# Patient Record
Sex: FEMALE | Race: WHITE | NOT HISPANIC OR LATINO | Employment: FULL TIME | URBAN - METROPOLITAN AREA
[De-identification: names, ages, dates, MRNs, and addresses within clinical notes are randomized per-mention and may not be internally consistent; named-entity substitution may affect disease eponyms.]

---

## 2017-08-01 PROBLEM — F32.A DEPRESSION: Status: ACTIVE | Noted: 2017-08-01

## 2017-08-02 PROBLEM — F34.1 DYSTHYMIC DISORDER: Chronic | Status: ACTIVE | Noted: 2017-08-01

## 2017-08-02 PROBLEM — Q60.0 CONGENITAL ABSENCE OF ONE KIDNEY: Status: ACTIVE | Noted: 2017-08-02

## 2017-08-02 PROBLEM — F41.9 ANXIETY: Status: ACTIVE | Noted: 2017-07-27

## 2017-08-02 PROBLEM — Q51.0: Chronic | Status: ACTIVE | Noted: 2017-08-02

## 2017-08-02 PROBLEM — F90.9 ADHD (ATTENTION DEFICIT HYPERACTIVITY DISORDER): Status: ACTIVE | Noted: 2017-08-02

## 2017-08-04 PROBLEM — F34.1 DYSTHYMIC DISORDER: Chronic | Status: ACTIVE | Noted: 2017-08-04

## 2017-08-04 PROBLEM — F31.81 BIPOLAR II DISORDER: Chronic | Status: ACTIVE | Noted: 2017-08-01

## 2017-08-08 PROBLEM — F34.1 DYSTHYMIC DISORDER: Chronic | Status: ACTIVE | Noted: 2017-08-08

## 2017-08-08 PROBLEM — Z51.5 COMFORT MEASURES ONLY STATUS: Status: ACTIVE | Noted: 2017-08-08

## 2020-09-10 DIAGNOSIS — Q60.0 CONGENITAL ABSENCE OF ONE KIDNEY: Primary | ICD-10-CM

## 2020-09-22 ENCOUNTER — LAB VISIT (OUTPATIENT)
Dept: LAB | Facility: HOSPITAL | Age: 30
End: 2020-09-22
Attending: INTERNAL MEDICINE
Payer: MEDICAID

## 2020-09-22 DIAGNOSIS — Q60.0 CONGENITAL ABSENCE OF ONE KIDNEY: ICD-10-CM

## 2020-09-22 LAB
ALBUMIN SERPL BCP-MCNC: 3.5 G/DL (ref 3.5–5.2)
ANION GAP SERPL CALC-SCNC: 12 MMOL/L (ref 8–16)
BASOPHILS # BLD AUTO: 0.07 K/UL (ref 0–0.2)
BASOPHILS NFR BLD: 0.8 % (ref 0–1.9)
BUN SERPL-MCNC: 11 MG/DL (ref 6–20)
CALCIUM SERPL-MCNC: 9.3 MG/DL (ref 8.7–10.5)
CHLORIDE SERPL-SCNC: 101 MMOL/L (ref 95–110)
CO2 SERPL-SCNC: 28 MMOL/L (ref 23–29)
CREAT SERPL-MCNC: 0.7 MG/DL (ref 0.5–1.4)
DIFFERENTIAL METHOD: ABNORMAL
EOSINOPHIL # BLD AUTO: 0.2 K/UL (ref 0–0.5)
EOSINOPHIL NFR BLD: 2.4 % (ref 0–8)
ERYTHROCYTE [DISTWIDTH] IN BLOOD BY AUTOMATED COUNT: 12.9 % (ref 11.5–14.5)
EST. GFR  (AFRICAN AMERICAN): >60 ML/MIN/1.73 M^2
EST. GFR  (NON AFRICAN AMERICAN): >60 ML/MIN/1.73 M^2
GLUCOSE SERPL-MCNC: 131 MG/DL (ref 70–110)
HCT VFR BLD AUTO: 40 % (ref 37–48.5)
HGB BLD-MCNC: 12.3 G/DL (ref 12–16)
IMM GRANULOCYTES # BLD AUTO: 0.05 K/UL (ref 0–0.04)
IMM GRANULOCYTES NFR BLD AUTO: 0.5 % (ref 0–0.5)
LYMPHOCYTES # BLD AUTO: 3 K/UL (ref 1–4.8)
LYMPHOCYTES NFR BLD: 31.9 % (ref 18–48)
MCH RBC QN AUTO: 27.9 PG (ref 27–31)
MCHC RBC AUTO-ENTMCNC: 30.8 G/DL (ref 32–36)
MCV RBC AUTO: 91 FL (ref 82–98)
MONOCYTES # BLD AUTO: 0.6 K/UL (ref 0.3–1)
MONOCYTES NFR BLD: 6.4 % (ref 4–15)
NEUTROPHILS # BLD AUTO: 5.4 K/UL (ref 1.8–7.7)
NEUTROPHILS NFR BLD: 58 % (ref 38–73)
NRBC BLD-RTO: 0 /100 WBC
PHOSPHATE SERPL-MCNC: 3 MG/DL (ref 2.7–4.5)
PLATELET # BLD AUTO: 328 K/UL (ref 150–350)
PMV BLD AUTO: 10.9 FL (ref 9.2–12.9)
POTASSIUM SERPL-SCNC: 4 MMOL/L (ref 3.5–5.1)
PTH-INTACT SERPL-MCNC: 41.6 PG/ML (ref 9–77)
RBC # BLD AUTO: 4.41 M/UL (ref 4–5.4)
SODIUM SERPL-SCNC: 141 MMOL/L (ref 136–145)
WBC # BLD AUTO: 9.29 K/UL (ref 3.9–12.7)

## 2020-09-22 PROCEDURE — 80069 RENAL FUNCTION PANEL: CPT

## 2020-09-22 PROCEDURE — 83970 ASSAY OF PARATHORMONE: CPT

## 2020-09-22 PROCEDURE — 82610 CYSTATIN C: CPT

## 2020-09-22 PROCEDURE — 85025 COMPLETE CBC W/AUTO DIFF WBC: CPT

## 2020-09-24 LAB
CYSTATIN C SERPL-MCNC: 0.63 MG/L (ref 0.59–0.98)
GFR/BSA.PRED SERPLBLD CYS-BASED-ARV: 124 ML/MIN/BSA

## 2020-09-29 RX ORDER — LAMOTRIGINE 100 MG/1
TABLET ORAL
COMMUNITY
Start: 2020-07-23

## 2020-09-29 RX ORDER — LAMOTRIGINE 200 MG/1
TABLET ORAL
COMMUNITY
Start: 2020-09-12

## 2020-09-30 ENCOUNTER — TELEPHONE (OUTPATIENT)
Dept: NEPHROLOGY | Facility: CLINIC | Age: 30
End: 2020-09-30

## 2020-10-13 ENCOUNTER — TELEPHONE (OUTPATIENT)
Dept: NEPHROLOGY | Facility: CLINIC | Age: 30
End: 2020-10-13

## 2020-10-13 DIAGNOSIS — Q60.0 CONGENITAL ABSENCE OF ONE KIDNEY: Primary | ICD-10-CM

## 2020-10-13 NOTE — TELEPHONE ENCOUNTER
----- Message from Laquita Ross sent at 10/13/2020  1:07 PM CDT -----  Pt is calling to reschedule an appt with office. I was unable to pull up schedule. Please call back at [phone]  .

## 2020-11-02 ENCOUNTER — LAB VISIT (OUTPATIENT)
Dept: LAB | Facility: HOSPITAL | Age: 30
End: 2020-11-02
Attending: INTERNAL MEDICINE
Payer: MEDICAID

## 2020-11-02 DIAGNOSIS — Q60.0 CONGENITAL ABSENCE OF ONE KIDNEY: ICD-10-CM

## 2020-11-02 PROCEDURE — 85025 COMPLETE CBC W/AUTO DIFF WBC: CPT

## 2020-11-02 PROCEDURE — 83970 ASSAY OF PARATHORMONE: CPT

## 2020-11-02 PROCEDURE — 80069 RENAL FUNCTION PANEL: CPT

## 2020-11-02 PROCEDURE — 36415 COLL VENOUS BLD VENIPUNCTURE: CPT | Mod: PO

## 2020-11-03 ENCOUNTER — TELEPHONE (OUTPATIENT)
Dept: NEPHROLOGY | Facility: CLINIC | Age: 30
End: 2020-11-03

## 2020-11-03 DIAGNOSIS — N39.0 UTI (URINARY TRACT INFECTION), UNCOMPLICATED: Primary | ICD-10-CM

## 2020-11-03 LAB
ALBUMIN SERPL BCP-MCNC: 4.3 G/DL (ref 3.5–5.2)
ANION GAP SERPL CALC-SCNC: 10 MMOL/L (ref 8–16)
BASOPHILS # BLD AUTO: 0.05 K/UL (ref 0–0.2)
BASOPHILS NFR BLD: 0.7 % (ref 0–1.9)
BUN SERPL-MCNC: 13 MG/DL (ref 6–20)
CALCIUM SERPL-MCNC: 8.9 MG/DL (ref 8.7–10.5)
CHLORIDE SERPL-SCNC: 102 MMOL/L (ref 95–110)
CO2 SERPL-SCNC: 27 MMOL/L (ref 23–29)
CREAT SERPL-MCNC: 0.8 MG/DL (ref 0.5–1.4)
DIFFERENTIAL METHOD: ABNORMAL
EOSINOPHIL # BLD AUTO: 0.2 K/UL (ref 0–0.5)
EOSINOPHIL NFR BLD: 2.8 % (ref 0–8)
ERYTHROCYTE [DISTWIDTH] IN BLOOD BY AUTOMATED COUNT: 13.3 % (ref 11.5–14.5)
EST. GFR  (AFRICAN AMERICAN): >60 ML/MIN/1.73 M^2
EST. GFR  (NON AFRICAN AMERICAN): >60 ML/MIN/1.73 M^2
GLUCOSE SERPL-MCNC: 57 MG/DL (ref 70–110)
HCT VFR BLD AUTO: 41.4 % (ref 37–48.5)
HGB BLD-MCNC: 12.9 G/DL (ref 12–16)
IMM GRANULOCYTES # BLD AUTO: 0.01 K/UL (ref 0–0.04)
IMM GRANULOCYTES NFR BLD AUTO: 0.1 % (ref 0–0.5)
LYMPHOCYTES # BLD AUTO: 3.1 K/UL (ref 1–4.8)
LYMPHOCYTES NFR BLD: 46.6 % (ref 18–48)
MCH RBC QN AUTO: 28.4 PG (ref 27–31)
MCHC RBC AUTO-ENTMCNC: 31.2 G/DL (ref 32–36)
MCV RBC AUTO: 91 FL (ref 82–98)
MONOCYTES # BLD AUTO: 0.5 K/UL (ref 0.3–1)
MONOCYTES NFR BLD: 7 % (ref 4–15)
NEUTROPHILS # BLD AUTO: 2.9 K/UL (ref 1.8–7.7)
NEUTROPHILS NFR BLD: 42.8 % (ref 38–73)
NRBC BLD-RTO: 0 /100 WBC
PHOSPHATE SERPL-MCNC: 2.7 MG/DL (ref 2.7–4.5)
PLATELET # BLD AUTO: 166 K/UL (ref 150–350)
PMV BLD AUTO: 12.6 FL (ref 9.2–12.9)
POTASSIUM SERPL-SCNC: 3.9 MMOL/L (ref 3.5–5.1)
PTH-INTACT SERPL-MCNC: 45 PG/ML (ref 9–77)
RBC # BLD AUTO: 4.54 M/UL (ref 4–5.4)
SODIUM SERPL-SCNC: 139 MMOL/L (ref 136–145)
WBC # BLD AUTO: 6.69 K/UL (ref 3.9–12.7)

## 2020-11-04 ENCOUNTER — OFFICE VISIT (OUTPATIENT)
Dept: NEPHROLOGY | Facility: CLINIC | Age: 30
End: 2020-11-04
Payer: MEDICAID

## 2020-11-04 VITALS
DIASTOLIC BLOOD PRESSURE: 63 MMHG | HEIGHT: 63 IN | BODY MASS INDEX: 21.41 KG/M2 | WEIGHT: 120.81 LBS | SYSTOLIC BLOOD PRESSURE: 105 MMHG | HEART RATE: 62 BPM

## 2020-11-04 DIAGNOSIS — N39.0 RECURRENT UTI (URINARY TRACT INFECTION): ICD-10-CM

## 2020-11-04 DIAGNOSIS — Q60.0 CONGENITAL ABSENCE OF ONE KIDNEY: Primary | ICD-10-CM

## 2020-11-04 DIAGNOSIS — N39.0 UTI (URINARY TRACT INFECTION), UNCOMPLICATED: ICD-10-CM

## 2020-11-04 PROCEDURE — 99999 PR PBB SHADOW E&M-EST. PATIENT-LVL IV: CPT | Mod: PBBFAC,,, | Performed by: INTERNAL MEDICINE

## 2020-11-04 PROCEDURE — 99204 OFFICE O/P NEW MOD 45 MIN: CPT | Mod: S$PBB,,, | Performed by: INTERNAL MEDICINE

## 2020-11-04 PROCEDURE — 99204 PR OFFICE/OUTPT VISIT, NEW, LEVL IV, 45-59 MIN: ICD-10-PCS | Mod: S$PBB,,, | Performed by: INTERNAL MEDICINE

## 2020-11-04 PROCEDURE — 99214 OFFICE O/P EST MOD 30 MIN: CPT | Mod: PBBFAC | Performed by: INTERNAL MEDICINE

## 2020-11-04 PROCEDURE — 99999 PR PBB SHADOW E&M-EST. PATIENT-LVL IV: ICD-10-PCS | Mod: PBBFAC,,, | Performed by: INTERNAL MEDICINE

## 2020-11-04 RX ORDER — OLANZAPINE 2.5 MG/1
2.5 TABLET ORAL NIGHTLY
COMMUNITY

## 2020-11-04 RX ORDER — SULFAMETHOXAZOLE AND TRIMETHOPRIM 400; 80 MG/1; MG/1
1 TABLET ORAL 2 TIMES DAILY
Qty: 60 TABLET | Refills: 0 | Status: SHIPPED | OUTPATIENT
Start: 2020-11-04

## 2020-11-04 RX ORDER — SERTRALINE HYDROCHLORIDE 100 MG/1
100 TABLET, FILM COATED ORAL DAILY
COMMUNITY

## 2020-11-04 NOTE — PATIENT INSTRUCTIONS
In order to prevent recurrent infections, we would recommend, frequent scheduled urinations which would help eliminate urine effectively without having any residual urine in the urinary bladder and help reduce the incidence of bladder infections.  Take cranberry juice at least half a glass 3 times a day to prevent bladder infections.    Drink plenty of fluids to make urine look like water

## 2020-11-04 NOTE — PROGRESS NOTES
Subjective:       Patient ID: Cristina Saini is a 30 y.o. White female who presents for new evaluation of solitary kidney and recurrent UTI    HPI     Patient is a 30-year-old female who has history of recurrent UTI.  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  Patient was told as a child that she has only 1 kidney.  Patient has had multiple urine testing done showing multiple episodes of pyuria and hematuria with symptoms of UTI.  She gets at least 1-2 3 episodes of UTI.  Patient does recall association of UTI with sexual intercourse.    Review of Systems   Constitutional: Negative for activity change, appetite change, chills, diaphoresis, fatigue, fever and unexpected weight change.   HENT: Negative for congestion, dental problem, drooling, postnasal drip, rhinorrhea and voice change.    Eyes: Negative for discharge.   Respiratory: Negative for apnea, cough, choking, chest tightness, shortness of breath, wheezing and stridor.    Cardiovascular: Negative for chest pain, palpitations and leg swelling.   Gastrointestinal: Negative for abdominal distention, blood in stool, constipation, diarrhea, nausea, rectal pain and vomiting.   Endocrine: Negative for cold intolerance, heat intolerance, polydipsia and polyuria.   Genitourinary: Negative for decreased urine volume, difficulty urinating, dysuria, enuresis, flank pain, frequency, hematuria and urgency.   Musculoskeletal: Negative for arthralgias, back pain, gait problem and joint swelling.   Skin: Negative for rash.   Allergic/Immunologic: Negative for food allergies and immunocompromised state.   Neurological: Negative for dizziness, tremors, syncope, numbness and headaches.   Hematological: Does not bruise/bleed easily.   Psychiatric/Behavioral: Negative for agitation, behavioral problems and self-injury. The patient is not nervous/anxious and is not hyperactive.    All other systems reviewed and are  "negative.      Objective:   /63   Pulse 62   Ht 5' 3" (1.6 m)   Wt 54.8 kg (120 lb 13 oz)   BMI 21.40 kg/m²      Physical Exam  Vitals signs and nursing note reviewed.   Constitutional:       Appearance: She is well-developed.   HENT:      Head: Normocephalic and atraumatic.   Eyes:      Conjunctiva/sclera: Conjunctivae normal.      Pupils: Pupils are equal, round, and reactive to light.   Neck:      Musculoskeletal: Full passive range of motion without pain, normal range of motion and neck supple. No edema.      Thyroid: No thyroid mass or thyromegaly.      Vascular: No carotid bruit.   Cardiovascular:      Rate and Rhythm: Normal rate and regular rhythm.      Chest Wall: PMI is not displaced.      Pulses: Normal pulses.      Heart sounds: Normal heart sounds, S1 normal and S2 normal. No murmur. No friction rub.   Pulmonary:      Effort: Pulmonary effort is normal. No accessory muscle usage or respiratory distress.      Breath sounds: Normal breath sounds. No wheezing or rales.   Chest:      Chest wall: No tenderness.   Abdominal:      General: Bowel sounds are normal. There is no distension.      Palpations: Abdomen is soft. There is no mass.      Tenderness: There is no abdominal tenderness. There is no rebound.      Hernia: No hernia is present.   Musculoskeletal: Normal range of motion.         General: No tenderness.   Skin:     General: Skin is warm and dry.      Coloration: Skin is not pale.      Findings: No bruising, ecchymosis, erythema or rash.      Nails: There is no clubbing.     Neurological:      Mental Status: She is alert and oriented to person, place, and time.      Cranial Nerves: No cranial nerve deficit.      Sensory: No sensory deficit.      Motor: No abnormal muscle tone.      Coordination: Coordination normal.      Deep Tendon Reflexes: Reflexes are normal and symmetric. Reflexes normal.   Psychiatric:         Speech: Speech normal.         Behavior: Behavior normal.         " Thought Content: Thought content normal.         Judgment: Judgment normal.           Lab Results   Component Value Date    CREATININE 0.8 11/02/2020    BUN 13 11/02/2020     11/02/2020    K 3.9 11/02/2020     11/02/2020    CO2 27 11/02/2020     Lab Results   Component Value Date    WBC 6.69 11/02/2020    HGB 12.9 11/02/2020    HCT 41.4 11/02/2020    MCV 91 11/02/2020     11/02/2020     Lab Results   Component Value Date    PTH 45.0 11/02/2020    CALCIUM 8.9 11/02/2020    PHOS 2.7 11/02/2020     @RESUFAST(URICACID)    Assessment:    )    1. Congenital absence of one kidney    2. UTI (urinary tract infection), uncomplicated    3. Recurrent UTI (urinary tract infection)        Plan:           We will check renal bladder ultrasound.  Will also consult Urology.  Bactrim prophylaxis discussed with the patient.  Literature on postcoital cystitis provided to the patient.

## 2020-11-06 ENCOUNTER — TELEPHONE (OUTPATIENT)
Dept: RADIOLOGY | Facility: HOSPITAL | Age: 30
End: 2020-11-06

## 2020-11-09 ENCOUNTER — OFFICE VISIT (OUTPATIENT)
Dept: UROLOGY | Facility: CLINIC | Age: 30
End: 2020-11-09
Payer: MEDICAID

## 2020-11-09 VITALS
WEIGHT: 123 LBS | SYSTOLIC BLOOD PRESSURE: 110 MMHG | DIASTOLIC BLOOD PRESSURE: 70 MMHG | BODY MASS INDEX: 21.79 KG/M2 | HEIGHT: 63 IN

## 2020-11-09 DIAGNOSIS — N30.00 ACUTE CYSTITIS WITHOUT HEMATURIA: Primary | ICD-10-CM

## 2020-11-09 DIAGNOSIS — N39.0 RECURRENT UTI (URINARY TRACT INFECTION): ICD-10-CM

## 2020-11-09 DIAGNOSIS — Q60.0 CONGENITAL ABSENCE OF ONE KIDNEY: ICD-10-CM

## 2020-11-09 PROCEDURE — 99203 PR OFFICE/OUTPT VISIT, NEW, LEVL III, 30-44 MIN: ICD-10-PCS | Mod: S$PBB,,, | Performed by: UROLOGY

## 2020-11-09 PROCEDURE — 99999 PR PBB SHADOW E&M-EST. PATIENT-LVL III: ICD-10-PCS | Mod: PBBFAC,,, | Performed by: UROLOGY

## 2020-11-09 PROCEDURE — 99213 OFFICE O/P EST LOW 20 MIN: CPT | Mod: PBBFAC | Performed by: UROLOGY

## 2020-11-09 PROCEDURE — 99999 PR PBB SHADOW E&M-EST. PATIENT-LVL III: CPT | Mod: PBBFAC,,, | Performed by: UROLOGY

## 2020-11-09 PROCEDURE — 99203 OFFICE O/P NEW LOW 30 MIN: CPT | Mod: S$PBB,,, | Performed by: UROLOGY

## 2020-11-09 NOTE — LETTER
November 9, 2020      Kelby Connors MD  [address] [place] [address]           [facility]  [address] [place] [place] [address]  Phone: [phone]  Fax: [phone]          Patient: Cristina Saini   MR Number: [id number]   YOB: 1990   Date of Visit: 11/9/2020       Dear Dr. Kelby Connors:    Thank you for referring Cristina Saini to me for evaluation. Attached you will find relevant portions of my assessment and plan of care.    If you have questions, please do not hesitate to call me. I look forward to following Cristina Saini along with you.    Sincerely,    Conrad Sandhu MD    Enclosure  CC:  No Recipients    If you would like to receive this communication electronically, please contact [email or web address] or [phone] to request more information on [facility] access.    For providers and/or their staff who would like to refer a patient to [facility], please contact us through our one-stop-shop provider referral line, [facility], at [phone].    If you feel you have received this communication in error or would no longer like to receive these types of communications, please e-mail [email or web address]

## 2020-11-09 NOTE — PROGRESS NOTES
Chief Complaint:   Encounter Diagnosis   Name Primary?    Acute cystitis without hematuria Yes       HPI:  30-year-old female who reports due to recurrent infection.  Patient states that she feels a lot of these seem to occur when she is not hydrating enough period they also appeared to be associated with intercourse.  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  She was referred over from Nephrology.  Patient has no gross hematuria, has been told she has intermittent microscopic hematuria, none on most recent urinalysis.  She is a smoker.  No other urological or gynecological history.  She has no evidence of incontinence, no lower urinary tract symptoms.  She is prescribed Bactrim for p.r.n. treatment of the infections.  Patient states that most usually only has about 3 per year.  Usually symptoms occur with odor, but no dysuria, no increase in frequency or urgency.  She has no family history of urological cancers or stones.  She is otherwise asymptomatic at this time.    Allergies:  Wellbutrin [bupropion hcl]    Medications:  has a current medication list which includes the following prescription(s): lamotrigine, multivitamin with minerals, olanzapine, sertraline, sulfamethoxazole-trimethoprim 400-80mg, aripiprazole, and lamotrigine.    Review of Systems:  General: No fever, chills, fatigability, or weight loss.  Skin: No rashes, itching, or changes in color or texture of skin.  Chest: Denies OWENS, cyanosis, wheezing, cough, and sputum production.  Abdomen: Appetite fine. No weight loss. Denies diarrhea, abdominal pain, hematemesis, or blood in stool.  Musculoskeletal: No joint stiffness or swelling. Denies back pain.  : As above.  All other review of systems negative.    PMH:   has a past medical history of ADHD (attention deficit hyperactivity disorder), Anxiety, Congenital absence of one kidney, Congenital absence of uterus, Depression, psychiatric  care, and Psychiatric problem.    PSH:   has no past surgical history on file.    FamHx: family history includes ADD / ADHD in her brother; Anxiety disorder in her father and mother.    SocHx:  reports that she has been smoking cigarettes. She has a 1.75 pack-year smoking history. She has never used smokeless tobacco. She reports current drug use. Drugs: Marijuana and MDMA (Ecstacy). She reports that she does not drink alcohol.      Physical Exam:  Vitals:    11/09/20 1052   BP: 110/70     General: A&Ox3, no apparent distress, no deformities  Neck: No masses, normal ROM  Lungs: normal inspiration, no use of accessory muscles  Heart: normal pulse, no arrhythmias  Abdomen: Soft, NT, ND, no masses, no hernias, no hepatosplenomegaly  Skin: The skin is warm and dry. No jaundice.  Ext: No c/c/e.    Labs/Studies:   None    Impression/Plan:     Recurrent UTI- as these are relatively low-grade symptoms, I recommended conservative treatment, consisting of increased fluid hydration and cranberry supplementation.  Will hold on antibiotics, and only use for severely symptomatic infections.  She will contact me with a recurrent infection, otherwise I will see her in 2 months.  And if she has done well at that point, we will pursue yearly from there.

## 2020-11-30 ENCOUNTER — TELEPHONE (OUTPATIENT)
Dept: RADIOLOGY | Facility: HOSPITAL | Age: 30
End: 2020-11-30

## 2021-08-06 ENCOUNTER — OFFICE VISIT (OUTPATIENT)
Dept: URGENT CARE | Facility: CLINIC | Age: 31
End: 2021-08-06
Payer: MEDICAID

## 2021-08-06 DIAGNOSIS — Z53.20 PROCEDURE NOT CARRIED OUT BECAUSE OF PATIENT'S DECISION: Primary | ICD-10-CM

## 2021-08-06 PROCEDURE — 99499 UNLISTED E&M SERVICE: CPT | Mod: S$GLB,,, | Performed by: NURSE PRACTITIONER

## 2021-08-06 PROCEDURE — 99499 NO LOS: ICD-10-PCS | Mod: S$GLB,,, | Performed by: NURSE PRACTITIONER
